# Patient Record
Sex: FEMALE | Race: BLACK OR AFRICAN AMERICAN | Employment: FULL TIME | URBAN - METROPOLITAN AREA
[De-identification: names, ages, dates, MRNs, and addresses within clinical notes are randomized per-mention and may not be internally consistent; named-entity substitution may affect disease eponyms.]

---

## 2017-03-06 ENCOUNTER — ED HISTORICAL/CONVERTED ENCOUNTER (OUTPATIENT)
Dept: OTHER | Age: 22
End: 2017-03-06

## 2018-10-15 ENCOUNTER — ED HISTORICAL/CONVERTED ENCOUNTER (OUTPATIENT)
Dept: OTHER | Age: 23
End: 2018-10-15

## 2019-02-08 ENCOUNTER — OP HISTORICAL/CONVERTED ENCOUNTER (OUTPATIENT)
Dept: OTHER | Age: 24
End: 2019-02-08

## 2019-02-09 ENCOUNTER — OP HISTORICAL/CONVERTED ENCOUNTER (OUTPATIENT)
Dept: OTHER | Age: 24
End: 2019-02-09

## 2019-02-15 ENCOUNTER — OP HISTORICAL/CONVERTED ENCOUNTER (OUTPATIENT)
Dept: OTHER | Age: 24
End: 2019-02-15

## 2019-03-12 ENCOUNTER — OP HISTORICAL/CONVERTED ENCOUNTER (OUTPATIENT)
Dept: OTHER | Age: 24
End: 2019-03-12

## 2019-04-05 ENCOUNTER — OP HISTORICAL/CONVERTED ENCOUNTER (OUTPATIENT)
Dept: OTHER | Age: 24
End: 2019-04-05

## 2019-04-22 ENCOUNTER — IP HISTORICAL/CONVERTED ENCOUNTER (OUTPATIENT)
Dept: OTHER | Age: 24
End: 2019-04-22

## 2019-07-02 LAB — PAP SMEAR, EXTERNAL: NORMAL

## 2019-08-06 ENCOUNTER — OP HISTORICAL/CONVERTED ENCOUNTER (OUTPATIENT)
Dept: OTHER | Age: 24
End: 2019-08-06

## 2019-08-07 ENCOUNTER — OP HISTORICAL/CONVERTED ENCOUNTER (OUTPATIENT)
Dept: OTHER | Age: 24
End: 2019-08-07

## 2020-01-09 ENCOUNTER — OP HISTORICAL/CONVERTED ENCOUNTER (OUTPATIENT)
Dept: OTHER | Age: 25
End: 2020-01-09

## 2020-07-24 VITALS
WEIGHT: 196.6 LBS | SYSTOLIC BLOOD PRESSURE: 110 MMHG | BODY MASS INDEX: 32.76 KG/M2 | DIASTOLIC BLOOD PRESSURE: 72 MMHG | HEIGHT: 65 IN

## 2020-07-24 PROBLEM — E66.9 OBESITY: Status: ACTIVE | Noted: 2020-07-24

## 2020-07-24 PROBLEM — G43.909 MIGRAINES: Status: ACTIVE | Noted: 2020-07-24

## 2020-07-24 RX ORDER — CYCLOBENZAPRINE HCL 10 MG
TABLET ORAL
COMMUNITY
End: 2021-02-17 | Stop reason: ALTCHOICE

## 2020-07-24 RX ORDER — SERTRALINE HYDROCHLORIDE 50 MG/1
TABLET, FILM COATED ORAL DAILY
COMMUNITY
End: 2021-02-17 | Stop reason: ALTCHOICE

## 2020-07-24 RX ORDER — MEDROXYPROGESTERONE ACETATE 150 MG/ML
150 INJECTION, SUSPENSION INTRAMUSCULAR ONCE
COMMUNITY
End: 2021-02-17 | Stop reason: ALTCHOICE

## 2020-07-24 RX ORDER — SULFAMETHOXAZOLE AND TRIMETHOPRIM 800; 160 MG/1; MG/1
1 TABLET ORAL 2 TIMES DAILY
COMMUNITY
End: 2021-02-17 | Stop reason: ALTCHOICE

## 2020-07-24 RX ORDER — LEVONORGESTREL 52 MG/1
1 INTRAUTERINE DEVICE INTRAUTERINE ONCE
COMMUNITY
End: 2021-02-17 | Stop reason: ALTCHOICE

## 2020-07-24 RX ORDER — OXYCODONE HYDROCHLORIDE 5 MG/1
5 TABLET ORAL
COMMUNITY
End: 2021-02-17 | Stop reason: ALTCHOICE

## 2020-07-24 RX ORDER — FLUCONAZOLE 150 MG/1
150 TABLET ORAL DAILY
COMMUNITY
End: 2021-02-17 | Stop reason: ALTCHOICE

## 2020-07-24 RX ORDER — ESTRADIOL 1 MG/1
1 TABLET ORAL DAILY
COMMUNITY
End: 2021-02-17 | Stop reason: ALTCHOICE

## 2021-02-17 ENCOUNTER — OFFICE VISIT (OUTPATIENT)
Dept: OBGYN CLINIC | Age: 26
End: 2021-02-17
Payer: MEDICAID

## 2021-02-17 VITALS
SYSTOLIC BLOOD PRESSURE: 112 MMHG | DIASTOLIC BLOOD PRESSURE: 62 MMHG | TEMPERATURE: 98.2 F | BODY MASS INDEX: 34.32 KG/M2 | HEIGHT: 65 IN | WEIGHT: 206 LBS

## 2021-02-17 DIAGNOSIS — Z11.3 SCREENING EXAMINATION FOR VENEREAL DISEASE: ICD-10-CM

## 2021-02-17 DIAGNOSIS — N30.00 ACUTE CYSTITIS WITHOUT HEMATURIA: ICD-10-CM

## 2021-02-17 DIAGNOSIS — N64.4 BILATERAL MASTODYNIA: ICD-10-CM

## 2021-02-17 DIAGNOSIS — Z01.419 ROUTINE GYNECOLOGICAL EXAMINATION: ICD-10-CM

## 2021-02-17 DIAGNOSIS — Z12.4 SCREENING FOR MALIGNANT NEOPLASM OF CERVIX: Primary | ICD-10-CM

## 2021-02-17 PROCEDURE — 99395 PREV VISIT EST AGE 18-39: CPT | Performed by: OBSTETRICS & GYNECOLOGY

## 2021-02-17 RX ORDER — NITROFURANTOIN 25; 75 MG/1; MG/1
100 CAPSULE ORAL 2 TIMES DAILY
Qty: 10 CAP | Refills: 0 | Status: SHIPPED | OUTPATIENT
Start: 2021-02-17 | End: 2022-07-05 | Stop reason: ALTCHOICE

## 2021-02-17 RX ORDER — FLUCONAZOLE 150 MG/1
150 TABLET ORAL DAILY
Qty: 1 TAB | Refills: 0 | Status: SHIPPED | OUTPATIENT
Start: 2021-02-17 | End: 2021-02-18

## 2021-02-17 NOTE — PROGRESS NOTES
Eunice Somers is a , 30 y.o. female   No LMP recorded. Patient has had an ablation. She presents for her annual    She is having notes urinary sxs c/w UTI, also notes bilateral mastalgia. Menstrual status:  Her periods are: absent due to ablation. Cycles are: absent due to ablation. She does not have dysmenorrhea. Medical conditions:  Since her last annual GYN exam about two years ago, she has not the following changes in her health history: none. History reviewed. No pertinent past medical history. Past Surgical History:   Procedure Laterality Date    HX HYSTEROSCOPY WITH ENDOMETRIAL ABLATION      HX ORTHOPAEDIC      Surgery on hand.  DC LAP,TUBAL CAUTERY         Prior to Admission medications    Medication Sig Start Date End Date Taking? Authorizing Provider   nitrofurantoin, macrocrystal-monohydrate, (Macrobid) 100 mg capsule Take 1 Cap by mouth two (2) times a day. 21  Yes Chitra Pisano MD   fluconazole (DIFLUCAN) 150 mg tablet Take 1 Tab by mouth daily for 1 day. FDA advises cautious prescribing of oral fluconazole in pregnancy. 21 Yes Chitra Pisano MD       No Known Allergies       Tobacco History:  reports that she has never smoked. She has never used smokeless tobacco.  Alcohol Abuse:  reports current alcohol use. Drug Abuse:  reports no history of drug use. Family Medical/Cancer History:   Family History   Problem Relation Age of Onset    Breast Cancer Maternal Grandmother     Hypertension Maternal Grandmother     Asthma Maternal Grandmother           Review of Systems   Constitutional: Negative for chills, fever, malaise/fatigue and weight loss. HENT: Negative for congestion, ear pain, sinus pain and tinnitus. Eyes: Negative for blurred vision and double vision. Respiratory: Negative for cough, shortness of breath and wheezing. Cardiovascular: Negative for chest pain and palpitations.    Gastrointestinal: Negative for abdominal pain, blood in stool, constipation, diarrhea, heartburn, nausea and vomiting. Genitourinary: Positive for frequency. Negative for dysuria, flank pain, hematuria and urgency. Musculoskeletal: Negative for joint pain and myalgias. Skin: Negative for itching and rash. Neurological: Negative for dizziness, weakness and headaches. Psychiatric/Behavioral: Negative for depression, memory loss and suicidal ideas. The patient is not nervous/anxious and does not have insomnia. Physical Exam  Constitutional:       Appearance: Normal appearance. HENT:      Head: Normocephalic and atraumatic. Cardiovascular:      Rate and Rhythm: Normal rate. Heart sounds: Normal heart sounds. Pulmonary:      Effort: Pulmonary effort is normal.      Breath sounds: Normal breath sounds. Chest:      Breasts:         Right: Normal.         Left: Normal.   Abdominal:      General: Abdomen is flat. Palpations: Abdomen is soft. Genitourinary:     General: Normal vulva. Vagina: Normal.      Cervix: Normal.      Uterus: Normal.       Adnexa: Right adnexa normal and left adnexa normal.      Rectum: Normal.      Comments: PAP Obtained  Neurological:      Mental Status: She is alert. Psychiatric:         Mood and Affect: Mood normal.         Behavior: Behavior normal.         Thought Content: Thought content normal.          Visit Vitals  /62 (BP 1 Location: Left upper arm, BP Patient Position: Sitting)   Temp 98.2 °F (36.8 °C)   Ht 5' 5.3\" (1.659 m)   Wt 206 lb (93.4 kg)   BMI 33.97 kg/m²         Assessment:  Diagnoses and all orders for this visit:    1. Screening for malignant neoplasm of cervix  -     PAP IG, CT-NG, RFX APTIMA HPV ASCUS ([id number], [id number])    2. Routine gynecological examination  -     PAP IG, CT-NG, RFX APTIMA HPV ASCUS ([id number], [id number])    3. Screening examination for venereal disease  -     PAP IG, CT-NG, RFX APTIMA HPV ASCUS ([id number], [id number])    4.  Acute cystitis without hematuria    5. Bilateral mastodynia    Other orders  -     nitrofurantoin, macrocrystal-monohydrate, (Macrobid) 100 mg capsule; Take 1 Cap by mouth two (2) times a day. -     fluconazole (DIFLUCAN) 150 mg tablet; Take 1 Tab by mouth daily for 1 day. FDA advises cautious prescribing of oral fluconazole in pregnancy. Plan:Questions addressed, Vit.  E caps, watch caffeine and Chocolate intake  Counseled re: diet, exercise, healthy lifestyle  Return for Annual

## 2021-02-19 LAB
C TRACH RRNA CVX QL NAA+PROBE: NEGATIVE
CYTOLOGIST CVX/VAG CYTO: NORMAL
CYTOLOGY CVX/VAG DOC CYTO: NORMAL
CYTOLOGY CVX/VAG DOC THIN PREP: NORMAL
DX ICD CODE: NORMAL
LABCORP, 190119: NORMAL
Lab: NORMAL
N GONORRHOEA RRNA CVX QL NAA+PROBE: NEGATIVE
OTHER STN SPEC: NORMAL
STAT OF ADQ CVX/VAG CYTO-IMP: NORMAL

## 2022-03-18 PROBLEM — E66.9 OBESITY: Status: ACTIVE | Noted: 2020-07-24

## 2022-05-16 ENCOUNTER — TRANSCRIBE ORDER (OUTPATIENT)
Dept: SCHEDULING | Age: 27
End: 2022-05-16

## 2022-05-16 DIAGNOSIS — N64.4 MASTODYNIA: Primary | ICD-10-CM

## 2022-05-16 DIAGNOSIS — Z80.3 FAMILY HISTORY OF MALIGNANT NEOPLASM OF BREAST: ICD-10-CM

## 2022-06-02 ENCOUNTER — HOSPITAL ENCOUNTER (OUTPATIENT)
Dept: MAMMOGRAPHY | Age: 27
Discharge: HOME | End: 2022-06-02
Payer: COMMERCIAL

## 2022-06-02 DIAGNOSIS — N64.4 MASTODYNIA: ICD-10-CM

## 2022-06-02 DIAGNOSIS — Z80.3 FAMILY HISTORY OF MALIGNANT NEOPLASM OF BREAST: ICD-10-CM

## 2022-06-02 PROCEDURE — 76642 ULTRASOUND BREAST LIMITED: CPT

## 2022-06-02 PROCEDURE — 77062 BREAST TOMOSYNTHESIS BI: CPT

## 2022-07-05 ENCOUNTER — OFFICE VISIT (OUTPATIENT)
Dept: OBGYN CLINIC | Age: 27
End: 2022-07-05
Payer: COMMERCIAL

## 2022-07-05 VITALS
SYSTOLIC BLOOD PRESSURE: 112 MMHG | DIASTOLIC BLOOD PRESSURE: 68 MMHG | HEIGHT: 65 IN | WEIGHT: 219 LBS | BODY MASS INDEX: 36.49 KG/M2

## 2022-07-05 DIAGNOSIS — Z11.3 SCREENING EXAMINATION FOR VENEREAL DISEASE: ICD-10-CM

## 2022-07-05 DIAGNOSIS — Z12.72 ENCOUNTER FOR SCREENING FOR MALIGNANT NEOPLASM OF VAGINA: ICD-10-CM

## 2022-07-05 DIAGNOSIS — Z12.4 SCREENING FOR MALIGNANT NEOPLASM OF CERVIX: Primary | ICD-10-CM

## 2022-07-05 DIAGNOSIS — Z01.419 ROUTINE GYNECOLOGICAL EXAMINATION: ICD-10-CM

## 2022-07-05 PROCEDURE — 99395 PREV VISIT EST AGE 18-39: CPT | Performed by: OBSTETRICS & GYNECOLOGY

## 2022-07-05 RX ORDER — FLUTICASONE PROPIONATE 50 MCG
SPRAY, SUSPENSION (ML) NASAL
COMMUNITY
Start: 2022-04-13

## 2022-07-05 NOTE — PROGRESS NOTES
Gary Wilkins is a , 32 y.o. female   No LMP recorded. Patient has had a hysterectomy. She presents for her annual    She is having no significant problems. Menstrual status:  Cycles are absent due to ablation    Flow: absent. She does not have dysmenorrhea. Medical conditions:  Since her last annual GYN exam about one year ago, she has not the following changes in her health history: none. Mammogram History:    NIKO Results (most recent):  Results from Hospital Encounter encounter on 22    NIKO 3D DAVID W MAMMO BI DX INCL CAD    Narrative  DIGITAL BILATERAL TOMOSYNTHESIS MAMMOGRAM  AND BREAST ULTRASOUND:    INDICATION / HISTORY:  Spontaneous bilateral breast pain, not necessarily  cyclic. Pain has recently improved. Partial hysterectomy. No family history  breast cancer. COMPARISON:No comparison. TECHNIQUE:  Computer Aided Detection (CAD) was used in evaluating this  mammogram.    MAMMOGRAM FINDINGS: Bilateral MLO and CC C-View and tomosynthesis imaging  performed. Heterogeneously dense fibroglandular tissue limits mammographic  interpretation. No suspicious mass, calcification, architectural distortion. No  skin thickening or evident axillary adenopathy. ULTRASOUND FINDINGS: Targeted bilateral breast ultrasound performed at patient  directed areas of pain: Left 1:00-4:00 position 3 cm from nipple, and right  8:00-2:00 position 3-7 cm from nipple: Dense fibroglandular tissue. No abnormal  cystic or solid structure or architectural distortion. Impression  No mammographic or focal sonographic evidence of malignancy or  etiology for pain. Recommend clinical management of pain. RESULT CODE: ACR BI-RADS category 1, negative. FOLLOWUP CODE: Annual mammogram beginning age 28-36 or sooner as clinically  indicated. FOLLOW UP CODE : C,  clinical management. Ms Demetria Nyhan was given verbal result at the time of the study.  Written result  letter to follow, in the mail.            According to the [address], yearly mammograms are recommended  starting at age 36 and continuing as long as a woman is in good health. Clinical Breast Exams should be a part of a periodic health exam - about every  three years for women in their 25s and 35s and every year for women 40 and over. Breast self exam is an option for women starting in their 25s. Any breast  change noted on a breast self exam should be reported promptly to the patient's  healthcare provider. Breast MRI is recommended for women with an approximately  20-25% or greater lifetime risk of breast cancer, including women with a strong  family history of breast or ovarian cancer and women who have been treated for  Hodgkin's disease. A negative Mammography report should not discourage follow up or biopsy of a  clinically significant finding and/or abnormality. Dense breast tissue may obscure small neoplasms. DEXA Results (most recent):  No results found for this or any previous visit. History reviewed. No pertinent past medical history. Past Surgical History:   Procedure Laterality Date    HX HYSTEROSCOPY WITH ENDOMETRIAL ABLATION      HX ORTHOPAEDIC      Surgery on hand.  [place] LAP,TUBAL CAUTERY         Prior to Admission medications    Medication Sig Start Date End Date Taking? Authorizing Provider   fluticasone propionate (FLONASE) 50 mcg/actuation nasal spray USE 2 SPRAYS IN EACH NOSTRIL ONCE DAILY 4/13/22  Yes Provider, Historical       No Known Allergies       Tobacco History:  reports that she has never smoked. She has never used smokeless tobacco.  Alcohol use:  reports current alcohol use. Drug use:  reports no history of drug use.     Family Medical/Cancer History:   Family History   Problem Relation Age of Onset    Hypertension Maternal Grandmother     Asthma Maternal Grandmother     Breast Cancer Paternal Grandmother           Review of Systems   Constitutional: Negative for chills, fever, malaise/fatigue and weight loss. HENT: Negative for congestion, ear pain, sinus pain and tinnitus. Eyes: Negative for blurred vision and double vision. Respiratory: Negative for cough, shortness of breath and wheezing. Cardiovascular: Negative for chest pain and palpitations. Gastrointestinal: Negative for abdominal pain, blood in stool, constipation, diarrhea, heartburn, nausea and vomiting. Genitourinary: Negative for dysuria, flank pain, frequency, hematuria and urgency. Musculoskeletal: Negative for joint pain and myalgias. Skin: Negative for itching and rash. Neurological: Negative for dizziness, weakness and headaches. Psychiatric/Behavioral: Negative for depression, memory loss and suicidal ideas. The patient is not nervous/anxious and does not have insomnia. Physical Exam  Constitutional:       Appearance: Normal appearance. HENT:      Head: Normocephalic and atraumatic. Cardiovascular:      Rate and Rhythm: Normal rate. Heart sounds: Normal heart sounds. Pulmonary:      Effort: Pulmonary effort is normal.      Breath sounds: Normal breath sounds. Chest:   Breasts:      Right: Normal.      Left: Normal.       Abdominal:      General: Abdomen is flat. Palpations: Abdomen is soft. Genitourinary:     General: Normal vulva. Vagina: Normal.      Cervix: Normal.      Uterus: Normal.       Adnexa: Right adnexa normal and left adnexa normal.      Rectum: Normal.      Comments: PAP Obtained  Neurological:      Mental Status: She is alert. Psychiatric:         Mood and Affect: Mood normal.         Behavior: Behavior normal.         Thought Content: Thought content normal.          Visit Vitals  /68 (BP 1 Location: Left upper arm, BP Patient Position: Sitting, BP Cuff Size: Small adult)   Ht 5' 5.3\" (1.659 m)   Wt 219 lb (99.3 kg)   BMI 36.11 kg/m²         Assessment:   Diagnoses and all orders for this visit:    1.  Encounter for screening for malignant neoplasm of vagina  -     PAP IG, CT-NG, RFX APTIMA HPV ASCUS ([id number], [id number])    2. Routine gynecological examination  -     PAP IG, CT-NG, RFX APTIMA HPV ASCUS ([id number], [id number])    3.  Screening examination for venereal disease  -     PAP IG, CT-NG, RFX APTIMA HPV ASCUS ([id number], [id number])        Plan: Questions addressed  Counseled re: diet, exercise, healthy lifestyle  Return for Annual  Rec annual mammogram        Follow-up and Dispositions    · Return for 1 yr annual.

## 2022-07-05 NOTE — PROGRESS NOTES
Chief Complaint   Patient presents with    Annual Exam     Visit Vitals  /68 (BP 1 Location: Left upper arm, BP Patient Position: Sitting, BP Cuff Size: Small adult)   Ht 5' 5.3\" (1.659 m)   Wt 219 lb (99.3 kg)   BMI 36.11 kg/m²

## 2022-12-08 ENCOUNTER — HOSPITAL ENCOUNTER (EMERGENCY)
Age: 27
Discharge: HOME | End: 2022-12-08
Attending: EMERGENCY MEDICINE
Payer: COMMERCIAL

## 2022-12-08 VITALS
DIASTOLIC BLOOD PRESSURE: 65 MMHG | RESPIRATION RATE: 17 BRPM | TEMPERATURE: 98.7 F | WEIGHT: 215 LBS | HEART RATE: 81 BPM | BODY MASS INDEX: 42.21 KG/M2 | SYSTOLIC BLOOD PRESSURE: 122 MMHG | OXYGEN SATURATION: 98 % | HEIGHT: 60 IN

## 2022-12-08 DIAGNOSIS — R51.9 SINUS HEADACHE: Primary | ICD-10-CM

## 2022-12-08 PROCEDURE — 99283 EMERGENCY DEPT VISIT LOW MDM: CPT

## 2022-12-08 PROCEDURE — 74011250637 HC RX REV CODE- 250/637: Performed by: EMERGENCY MEDICINE

## 2022-12-08 RX ORDER — FEXOFENADINE HCL AND PSEUDOEPHEDRINE HCI 180; 240 MG/1; MG/1
1 TABLET, EXTENDED RELEASE ORAL DAILY
Qty: 15 TABLET | Refills: 0 | Status: SHIPPED | OUTPATIENT
Start: 2022-12-08

## 2022-12-08 RX ORDER — ACETAMINOPHEN 500 MG
1000 TABLET ORAL ONCE
Status: COMPLETED | OUTPATIENT
Start: 2022-12-08 | End: 2022-12-08

## 2022-12-08 RX ORDER — FLUTICASONE PROPIONATE 50 MCG
2 SPRAY, SUSPENSION (ML) NASAL DAILY
Qty: 1 EACH | Refills: 0 | Status: SHIPPED | OUTPATIENT
Start: 2022-12-08

## 2022-12-08 RX ORDER — IBUPROFEN 800 MG/1
800 TABLET ORAL ONCE
Status: COMPLETED | OUTPATIENT
Start: 2022-12-08 | End: 2022-12-08

## 2022-12-08 RX ORDER — AMOXICILLIN AND CLAVULANATE POTASSIUM 875; 125 MG/1; MG/1
1 TABLET, FILM COATED ORAL 2 TIMES DAILY
Qty: 20 TABLET | Refills: 0 | Status: SHIPPED | OUTPATIENT
Start: 2022-12-08 | End: 2022-12-18

## 2022-12-08 RX ORDER — DIPHENHYDRAMINE HCL 25 MG
25 TABLET ORAL
Qty: 20 TABLET | Refills: 0 | Status: SHIPPED | OUTPATIENT
Start: 2022-12-08

## 2022-12-08 RX ADMIN — IBUPROFEN 800 MG: 800 TABLET, FILM COATED ORAL at 19:28

## 2022-12-08 RX ADMIN — ACETAMINOPHEN 1000 MG: 500 TABLET ORAL at 19:28

## 2022-12-08 NOTE — ED TRIAGE NOTES
Pt reports persistent headache from center of forehead to right side of face and neck. Pt states pain began 12/2/2022 when a sippy cup fell into center of forehead. Pt states she can take naproxen and have relief for an hour until it returns. Pt rates pain 7/10 currently.

## 2022-12-09 NOTE — ED PROVIDER NOTES
EMERGENCY DEPARTMENT HISTORY AND PHYSICAL EXAM      Date: 12/8/2022  Patient Name: Starla Glynn    History of Presenting Illness     Chief Complaint   Patient presents with    Headache       History Provided By: Patient    HPI: Starla Glynn, 32 y.o. female  reports right fontal headache that spreads to the left face and occiput. States had a sippy cup full of juice fell onto her head. No loc no nausea, vomiting. Taking Naproxyn with some relief. Does note history of sinus dysfunction and on zyrtec daily. There are no other complaints, changes, or physical findings at this time. Past History     Past Medical History:  No past medical history on file. Past Surgical History:  Past Surgical History:   Procedure Laterality Date    HX HYSTEROSCOPY WITH ENDOMETRIAL ABLATION      HX ORTHOPAEDIC      Surgery on hand. [place] LAP,TUBAL CAUTERY         Family History:  Family History   Problem Relation Age of Onset    Hypertension Maternal Grandmother     Asthma Maternal Grandmother     Breast Cancer Paternal Grandmother        Social History:  Social History     Tobacco Use    Smoking status: Never    Smokeless tobacco: Never   Vaping Use    Vaping Use: Never used   Substance Use Topics    Alcohol use: Yes    Drug use: Never       Allergies:  No Known Allergies    PCP: Minnie Rivera MD    No current facility-administered medications on file prior to encounter. Current Outpatient Medications on File Prior to Encounter   Medication Sig Dispense Refill    fluticasone propionate (FLONASE) 50 mcg/actuation nasal spray USE 2 SPRAYS IN EACH NOSTRIL ONCE DAILY         Review of Systems   Review of Systems   Constitutional:  Positive for activity change. Negative for appetite change. HENT:  Positive for sinus pressure. Negative for congestion, ear pain, sinus pain, sore throat, trouble swallowing and voice change. Eyes:  Negative for redness and visual disturbance.    Respiratory:  Negative for cough, chest tightness and shortness of breath. Cardiovascular:  Negative for chest pain, palpitations and leg swelling. Gastrointestinal:  Negative for abdominal pain, diarrhea, nausea and vomiting. Genitourinary:  Negative for dysuria and flank pain. Musculoskeletal:  Negative for arthralgias, back pain, gait problem and myalgias. Skin:  Negative for rash. Neurological:  Positive for headaches. Negative for dizziness, speech difficulty, light-headedness and numbness. Psychiatric/Behavioral:  Negative for suicidal ideas. The patient is not nervous/anxious. All other systems reviewed and are negative. Physical Exam   Physical Exam  Vitals and nursing note reviewed. Constitutional:       Appearance: Normal appearance. HENT:      Head: Normocephalic and atraumatic. Nose: Nose normal.      Mouth/Throat:      Comments: Tenderness over the right frontal and maxillary sinuses. Eyes:      Conjunctiva/sclera: Conjunctivae normal.   Pulmonary:      Effort: Pulmonary effort is normal.   Abdominal:      General: Abdomen is flat. Musculoskeletal:         General: Normal range of motion. Neurological:      General: No focal deficit present. Mental Status: She is alert and oriented to person, place, and time. Mental status is at baseline. Cranial Nerves: No cranial nerve deficit. Sensory: No sensory deficit. Motor: No weakness. Coordination: Coordination normal.      Gait: Gait normal.      Deep Tendon Reflexes: Reflexes normal.      Comments: No pronator drift, normal Romberg, strength out of 5 in upper and lower extremities. Psychiatric:         Mood and Affect: Mood normal.         Thought Content:  Thought content normal.            Medical Decision Making and ED Course   Differential Diagnosis & Medical Decision Making Provider Note:   Patient is region emergency department with headache most consistent with the sinus headache like related to her chronic sinus issues. Clinically not concerned for neurologic emergency. Her exam is normal she has tenderness of her sinuses consistent with such. Patient was given return precautions and red flag symptoms to return immediately to the emergency department. - I am the first provider for this patient. I reviewed the vital signs, available nursing notes, past medical history, past surgical history, family history and social history. The patients presenting problems have been discussed, and they are in agreement with the care plan formulated and outlined with them. I have encouraged them to ask questions as they arise throughout their visit. Vital Signs-Reviewed the patient's vital signs. Patient Vitals for the past 12 hrs:   Temp Pulse Resp BP SpO2   12/08/22 1848 98.7 °F (37.1 °C) 81 17 122/65 98 %       ED Course:           Disposition   Disposition: Condition stable  DC- Adult Discharges: All of the diagnostic tests were reviewed and questions answered. Diagnosis, care plan and treatment options were discussed. The patient understands the instructions and will follow up as directed. The patients results have been reviewed with them. They have been counseled regarding their diagnosis. The patient verbally convey understanding and agreement of the signs, symptoms, diagnosis, treatment and prognosis and additionally agrees to follow up as recommended with their PCP in 24 - 48 hours. They also agree with the care-plan and convey that all of their questions have been answered. I have also put together some discharge instructions for them that include: 1) educational information regarding their diagnosis, 2) how to care for their diagnosis at home, as well a 3) list of reasons why they would want to return to the ED prior to their follow-up appointment, should their condition change.   DC-The patient was given verbal follow-up and headache warning signs and instructions  DC- Pain Control DC Home plan: Nonsteroidals, Tylenol, and Referral Family Medicine/PCP      DISCHARGE PLAN:  1. Current Discharge Medication List        CONTINUE these medications which have NOT CHANGED    Details   fluticasone propionate (FLONASE) 50 mcg/actuation nasal spray USE 2 SPRAYS IN EACH NOSTRIL ONCE DAILY           2. Follow-up Information       Follow up With Specialties Details Why Contact Info    Rico Flowers MD Family Medicine  As needed, For followup and recheck of todays symptoms [address] [facility]  [phone]      [facility] EMERGENCY DEPT Emergency Medicine Go to  As needed, or for any concerns or deteriorations. , if symptoms persist or worsen. [address] [place] [address]  [phone]           3. Return to ED if worse   4. Discharge Medication List as of 12/8/2022  7:34 PM        START taking these medications    Details   guaiFENesin-dextromethorphan SR (Mucinex DM) 600-30 mg per tablet Take 1 Tablet by mouth two (2) times a day., Normal, Disp-20 Tablet, R-0      diphenhydrAMINE (BENADRYL) 25 mg tablet Take 1 Tablet by mouth every six (6) hours as needed for Allergies or Congestion. , Normal, Disp-20 Tablet, R-0      amoxicillin-clavulanate (Augmentin) 875-125 mg per tablet Take 1 Tablet by mouth two (2) times a day for 10 days. , Normal, Disp-20 Tablet, R-0\"Wait and see antibiotics\"      fexofenadine-pseudoephedrine (Allegra-D 24 Hour) 180-240 mg per tablet Take 1 Tablet by mouth daily. , Normal, Disp-15 Tablet, R-0           CONTINUE these medications which have CHANGED    Details   fluticasone propionate (FLONASE) 50 mcg/actuation nasal spray 2 Sprays by Both Nostrils route daily. , Normal, Disp-1 Each, R-0              Diagnosis/Clinical Impression     Clinical Impression:   1. Sinus headache        Attestations: Umer Mijares MD, am the primary clinician of record. Please note that this dictation was completed with [employer], the [employer] voice recognition software.   Quite often unanticipated grammatical, syntax, homophones, and other interpretive errors are inadvertently transcribed by the computer software. Please disregard these errors. Please excuse any errors that have escaped final proofreading. Thank you.

## 2023-04-21 DIAGNOSIS — N64.4 MASTODYNIA: Primary | ICD-10-CM

## 2023-04-21 DIAGNOSIS — Z80.3 FAMILY HISTORY OF MALIGNANT NEOPLASM OF BREAST: ICD-10-CM

## 2023-04-25 ENCOUNTER — TRANSCRIBE ORDER (OUTPATIENT)
Dept: SCHEDULING | Age: 28
End: 2023-04-25

## 2023-04-25 DIAGNOSIS — R59.0 INGUINAL LYMPHADENOPATHY: Primary | ICD-10-CM
